# Patient Record
Sex: FEMALE | Race: WHITE
[De-identification: names, ages, dates, MRNs, and addresses within clinical notes are randomized per-mention and may not be internally consistent; named-entity substitution may affect disease eponyms.]

---

## 2017-02-23 ENCOUNTER — HOSPITAL ENCOUNTER (EMERGENCY)
Dept: HOSPITAL 62 - ER | Age: 27
Discharge: HOME | End: 2017-02-23
Payer: OTHER GOVERNMENT

## 2017-02-23 VITALS — DIASTOLIC BLOOD PRESSURE: 80 MMHG | SYSTOLIC BLOOD PRESSURE: 145 MMHG

## 2017-02-23 DIAGNOSIS — H10.9: Primary | ICD-10-CM

## 2017-02-23 PROCEDURE — 99282 EMERGENCY DEPT VISIT SF MDM: CPT

## 2017-02-23 NOTE — ER DOCUMENT REPORT
HPI





- HPI


Patient complains to provider of: eye discomfort


Onset: Yesterday


Onset/Duration: Gradual


Quality of pain: Burning


Pain Level: 5


Context: 


Patient states that she recently broke her glasses and has been wearing contact 

lenses for the past month.  Patient states that she has been wearing her 

contact lenses for the past 3 weeks.  Patient states she does take them out at 

nighttime and does not sleep in them.  Patient is currently wearing monthly 

disposable lenses.  Patient states that she started to have left eye discomfort 

yesterday.  Patient has taken her contact lens out since then.  Patient does 

feel like something is in the eye.  Patient denies any eye injury.  Patient 

reports some blurred vision.


Associated Symptoms: Other - Left eye discomfort


Exacerbated by: Denies


Relieved by: Denies


Similar symptoms previously: No


Recently seen / treated by doctor: No





- ROS


ROS below otherwise negative: Yes


Systems Reviewed and Negative: Yes All other systems reviewed and negative





- EENT


EENT: REPORTS: Eye problems





- NEURO


Neurology: DENIES: Headache





- GASTROINTESTINAL


Gastrointestinal: DENIES: Nausea, Patient vomiting





- REPRODUCTIVE


Reproductive: DENIES: Pregnant:





- DERM


Skin Color: Normal


Skin Problems: None





Past Medical History





- General


Information source: Patient





- Social History


Smoking Status: Never Smoker


Frequency of alcohol use: None


Drug Abuse: None


Occupation: kennel worker


Lives with: Family


Family History: Reviewed & Not Pertinent


Patient has suicidal ideation: No


Patient has homicidal ideation: No





- Past Medical History


Cardiac Medical History: 


   Denies: Hx Coronary Artery Disease, Hx Heart Attack, Hx Hypertension


Pulmonary Medical History: 


   Denies: Hx Asthma, Hx Bronchitis, Hx COPD, Hx Pneumonia


Neurological Medical History: Denies: Hx Cerebrovascular Accident, Hx Seizures


Renal/ Medical History: Reports: Other - Endometriosis, PCO S.  Denies: Hx 

Peritoneal Dialysis


Musculoskeltal Medical History: Denies Hx Arthritis


Past Surgical History: Reports: Hx Gynecologic Surgery - laparoscopic 

exploratory





- Immunizations


Immunizations up to date: No


Hx Diphtheria, Pertussis, Tetanus Vaccination: Yes





Vertical Provider Document





- CONSTITUTIONAL


Agree With Documented VS: Yes


Exam Limitations: No Limitations


General Appearance: WD/WN, No Apparent Distress


Notes: 


Left eye tearing with mild injection to sclera, no corneal abrasion/ulcer, 

fluoroscein uptake, no dendrite, or no foreign body.





- INFECTION CONTROL


TRAVEL OUTSIDE OF THE [place] IN LAST 30 DAYS: No





- HEENT


HEENT: Atraumatic, Normocephalic





- NECK


Neck: Normal Inspection





- RESPIRATORY


Respiratory: Breath Sounds Normal, No Respiratory Distress


O2 Sat by Pulse Oximetry: 100





- CARDIOVASCULAR


Cardiovascular: Regular Rate, Regular Rhythm, No Murmur





- BACK


Back: Normal Inspection





- MUSCULOSKELETAL/EXTREMETIES


Musculoskeletal/Extremeties: MAEW





- NEURO


Level of Consciousness: Awake, Alert, Appropriate


Motor/Sensory: No Motor Deficit





- DERM


Integumentary: Warm, Dry, No Rash





Course





- Vital Signs


Vital signs: 


 











Temp Pulse Resp BP Pulse Ox


 


 98.1 F   84   18   146/89 H  100 


 


 02/23/17 08:01  02/23/17 08:01  02/23/17 08:01  02/23/17 08:01  02/23/17 06:34














Discharge





- Discharge


Clinical Impression: 


Conjunctivitis


Qualifiers:


 Conjunctivitis type: unspecified Laterality: left Qualified Code(s): H10.9 - 

Unspecified conjunctivitis





Condition: Stable


Disposition: HOME, SELF-CARE


Instructions:  Conjunctivitis ([facility]), Eyedrop Use ([facility])


Additional Instructions: 


Return immediately for any new or worsening symptoms





Followup with your primary care provider, call tomorrow to make a followup 

appointment





Do not wear your contact lenses, until cleared to do so by an ophthalmologist





Follow-up with your ophthalmologist for recheck








Prescriptions: 


Besifloxacin HCl [Besivance 0.6% Oph Susp 5 ml] 1 drop OP TID #1 bottle


Referrals: 


[facility] [Provider Group] - Follow up as needed


[facility] [Provider Group] - Follow up as needed


THADDEUS MEJIA MD [Primary Care Provider] - Follow up tomorrow

## 2017-02-25 ENCOUNTER — HOSPITAL ENCOUNTER (EMERGENCY)
Dept: HOSPITAL 62 - ER | Age: 27
LOS: 1 days | Discharge: HOME | End: 2017-02-26
Payer: COMMERCIAL

## 2017-02-25 VITALS — DIASTOLIC BLOOD PRESSURE: 86 MMHG | SYSTOLIC BLOOD PRESSURE: 127 MMHG

## 2017-02-25 DIAGNOSIS — S60.221A: ICD-10-CM
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
## 2017-02-25 DIAGNOSIS — S80.211A: Primary | ICD-10-CM

## 2017-02-25 DIAGNOSIS — S40.211A: ICD-10-CM

## 2017-02-25 PROCEDURE — 99283 EMERGENCY DEPT VISIT LOW MDM: CPT
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
## 2017-04-13 ENCOUNTER — HOSPITAL ENCOUNTER (EMERGENCY)
Dept: HOSPITAL 62 - ER | Age: 27
LOS: 1 days | Discharge: HOME | End: 2017-04-14
Payer: OTHER GOVERNMENT

## 2017-04-13 DIAGNOSIS — N12: Primary | ICD-10-CM

## 2017-04-13 DIAGNOSIS — R30.0: ICD-10-CM

## 2017-04-13 DIAGNOSIS — R50.9: ICD-10-CM

## 2017-04-13 DIAGNOSIS — R10.9: ICD-10-CM

## 2017-04-13 DIAGNOSIS — R11.2: ICD-10-CM

## 2017-04-13 PROCEDURE — 99284 EMERGENCY DEPT VISIT MOD MDM: CPT

## 2017-04-13 PROCEDURE — 87088 URINE BACTERIA CULTURE: CPT

## 2017-04-13 PROCEDURE — 81025 URINE PREGNANCY TEST: CPT

## 2017-04-13 PROCEDURE — 87186 SC STD MICRODIL/AGAR DIL: CPT

## 2017-04-13 PROCEDURE — 81001 URINALYSIS AUTO W/SCOPE: CPT

## 2017-04-13 PROCEDURE — 87086 URINE CULTURE/COLONY COUNT: CPT

## 2017-04-13 PROCEDURE — 36415 COLL VENOUS BLD VENIPUNCTURE: CPT

## 2017-04-14 VITALS — DIASTOLIC BLOOD PRESSURE: 80 MMHG | SYSTOLIC BLOOD PRESSURE: 124 MMHG

## 2017-04-14 LAB
APPEARANCE UR: (no result)
BILIRUB UR QL STRIP: NEGATIVE
GLUCOSE UR STRIP-MCNC: NEGATIVE MG/DL
KETONES UR STRIP-MCNC: NEGATIVE MG/DL
NITRITE UR QL STRIP: POSITIVE
PH UR STRIP: 5 [PH] (ref 5–9)
PROT UR STRIP-MCNC: NEGATIVE MG/DL
SP GR UR STRIP: 1.03
UROBILINOGEN UR-MCNC: 4 MG/DL (ref ?–2)

## 2017-04-14 NOTE — ER DOCUMENT REPORT
ED General





- General


Chief Complaint: Flank Pain


Stated Complaint: FLANK PAIN, FEVER


Notes: 


Patient is a 26-year-old female who presents with 24 hours of left-sided flank 

pain, dysuria, and nausea with one episode of vomiting.  She denies any focal 

abdominal tenderness.  States this feels similar to what she's had kidney 

infections in the past.  She has not seen her primary care doctor regarding 

today's concerns.  Described pain in her left kidney as a dull, constant 

throbbing pain to left side of her back.  Nothing improves or worsens her pain.

  She is not had a recorded fever at home.  She has been able to tolerate oral 

intake after the episode of vomiting.


TRAVEL OUTSIDE OF THE [place] IN LAST 30 DAYS: No





- Related Data


Allergies/Adverse Reactions: 


 





No Known Allergies Allergy (Verified 02/23/17 08:31)


 











Past Medical History





- General


Information source: Patient





- Social History


Smoking Status: Never Smoker


Frequency of alcohol use: None


Drug Abuse: None


Lives with: Spouse/Significant other


Family History: Reviewed & Not Pertinent


Patient has suicidal ideation: No


Patient has homicidal ideation: No





- Past Medical History


Cardiac Medical History: 


   Denies: Hx Coronary Artery Disease, Hx Heart Attack, Hx Hypertension


Pulmonary Medical History: 


   Denies: Hx Asthma, Hx Bronchitis, Hx COPD, Hx Pneumonia


Neurological Medical History: Denies: Hx Cerebrovascular Accident, Hx Seizures


Renal/ Medical History: Denies: Hx Peritoneal Dialysis


Musculoskeltal Medical History: Denies Hx Arthritis


Past Surgical History: Reports: Hx Gynecologic Surgery - laparoscopic 

exploratory





- Immunizations


Immunizations up to date: No


Hx Diphtheria, Pertussis, Tetanus Vaccination: Yes





Review of Systems





- Review of Systems


Notes: 


Constitutional: Negative for fever.


HENT: Negative for sore throat.


Eyes: Negative for visual changes.


Cardiovascular: Negative for chest pain.


Respiratory: Negative for shortness of breath.


Gastrointestinal: Negative for abdominal pain, positive for vomiting.


Genitourinary: Positive for dysuria and left flank pain


Musculoskeletal: Negative for back pain.


Skin: Negative for rash.


Neurological: Negative for headaches, weakness or numbness.





10 point ROS negative except as marked above and in HPI.





Physical Exam





- Vital signs


Vitals: 


 











Temp Pulse Resp BP Pulse Ox


 


 98.6 F   111 H  18   132/77 H  97 


 


 04/13/17 23:44  04/13/17 23:44  04/13/17 23:44  04/13/17 23:44  04/13/17 23:44








Tachycardia is resolved at the time of my assessment a heart rate of 84.


Interpretation: Tachycardic


Notes: 


PHYSICAL EXAMINATION:





GENERAL: Well-appearing, well-nourished and in no acute distress.





HEAD: Atraumatic, normocephalic.





EYES: Pupils equal round and reactive to light, extraocular movements intact, 

sclera anicteric, conjunctiva are normal.





ENT: nares patent, oropharynx clear without exudates.  Moist mucous membranes.





NECK: Normal range of motion, supple without lymphadenopathy





LUNGS: Breath sounds clear to auscultation bilaterally and equal.  No wheezes 

rales or rhonchi.





HEART: Regular rate and rhythm without murmurs





ABDOMEN: Soft, nontender, normoactive bowel sounds.  No guarding, no rebound.  

No masses appreciated.  Mild left CVA tenderness.





EXTREMITIES: Normal range of motion, no pitting or edema.  No cyanosis.





NEUROLOGICAL: No focal neurological deficits. Moves all extremities 

spontaneously and on command.





PSYCH: Normal mood, normal affect.





SKIN: Warm, Dry, normal turgor, no rashes or lesions noted.





Course





- Re-evaluation


Re-evalutation: 





04/14/17 03:29


Presentation is most consistent with acute pyelonephritis.  Urinalysis with 

bacteria but is contaminated. Patient has had constitutional symptoms at home 

as well as a fever.  CVA tenderness is present on exam.   I do not suspect an 

acute appendicitis, biliary pathology, pancreatitis, intra-abdominal abscess, 

or tubo-ovarian abscess based on history and examination.she's been treated 

with cephalexin and Zofran.  Patient is able to tolerate oral intake without 

difficulty.  Will be discharged home on 7 day course of cephalexin.  A urine 

culture has been sent.  At this time will discharge with return precautions and 

follow-up recommendations.  Verbal discharge instructions given a the bedside 

and opportunity for questions given. Medication warnings reviewed. Patient is 

in agreement with this plan and has verbalized understanding of return 

precautions and the need for primary care follow-up in the next 24-72 hours.





- Vital Signs


Vital signs: 


 











Temp Pulse Resp BP Pulse Ox


 


 98.6 F   111 H  18   132/77 H  97 


 


 04/13/17 23:44  04/13/17 23:44  04/13/17 23:44  04/13/17 23:44  04/13/17 23:44














- Laboratory


Laboratory results interpreted by me: 


 











  04/14/17





  00:00


 


Urine Nitrite  POSITIVE H


 


Urine Urobilinogen  4.0 H














Discharge





- Discharge


Clinical Impression: 


 Pyelonephritis





Condition: Good


Disposition: HOME, SELF-CARE


Additional Instructions: 


You have been diagnosed with a condition called pyelonephritis which is an 

infection involving your kidneys and bladder.  You have been given a dose of 

antibiotics here in the emergency department to help begin to treat this 

infection.  Your also being sent home on antibiotics.  Please start taking 

these later on today when you fill the prescription.  Complete the course even 

if you feel better. Please return if you have persistent vomiting, pass out, 

have worsening pain, become unable to tolerate fluids, or have any other 

symptoms that are concerning to you.  Please follow-up with your primary care 

physician in the next 24-48 hours.

## 2019-02-21 ENCOUNTER — HOSPITAL ENCOUNTER (EMERGENCY)
Dept: HOSPITAL 62 - ER | Age: 29
LOS: 1 days | Discharge: HOME | End: 2019-02-22
Payer: SELF-PAY

## 2019-02-21 DIAGNOSIS — R51: ICD-10-CM

## 2019-02-21 DIAGNOSIS — M79.10: ICD-10-CM

## 2019-02-21 DIAGNOSIS — M79.89: ICD-10-CM

## 2019-02-21 DIAGNOSIS — R79.89: ICD-10-CM

## 2019-02-21 DIAGNOSIS — R42: ICD-10-CM

## 2019-02-21 DIAGNOSIS — L50.9: ICD-10-CM

## 2019-02-21 DIAGNOSIS — K08.89: Primary | ICD-10-CM

## 2019-02-21 LAB
ADD MANUAL DIFF: NO
ALBUMIN SERPL-MCNC: 3.7 G/DL (ref 3.5–5)
ALP SERPL-CCNC: 96 U/L (ref 38–126)
ALT SERPL-CCNC: 82 U/L (ref 9–52)
ANION GAP SERPL CALC-SCNC: 5 MMOL/L (ref 5–19)
AST SERPL-CCNC: 57 U/L (ref 14–36)
BASOPHILS # BLD AUTO: 0 10^3/UL (ref 0–0.2)
BASOPHILS NFR BLD AUTO: 0.9 % (ref 0–2)
BILIRUB DIRECT SERPL-MCNC: 0.1 MG/DL (ref 0–0.4)
BILIRUB SERPL-MCNC: 0.4 MG/DL (ref 0.2–1.3)
BUN SERPL-MCNC: 8 MG/DL (ref 7–20)
CALCIUM: 9.3 MG/DL (ref 8.4–10.2)
CHLORIDE SERPL-SCNC: 102 MMOL/L (ref 98–107)
CK SERPL-CCNC: 127 U/L (ref 30–135)
CO2 SERPL-SCNC: 32 MMOL/L (ref 22–30)
EOSINOPHIL # BLD AUTO: 0.3 10^3/UL (ref 0–0.6)
EOSINOPHIL NFR BLD AUTO: 6.6 % (ref 0–6)
ERYTHROCYTE [DISTWIDTH] IN BLOOD BY AUTOMATED COUNT: 15.5 % (ref 11.5–14)
GLUCOSE SERPL-MCNC: 109 MG/DL (ref 75–110)
HCT VFR BLD CALC: 36.3 % (ref 36–47)
HGB BLD-MCNC: 12.7 G/DL (ref 12–15.5)
LYMPHOCYTES # BLD AUTO: 1.5 10^3/UL (ref 0.5–4.7)
LYMPHOCYTES NFR BLD AUTO: 34.7 % (ref 13–45)
MCH RBC QN AUTO: 28.8 PG (ref 27–33.4)
MCHC RBC AUTO-ENTMCNC: 34.9 G/DL (ref 32–36)
MCV RBC AUTO: 83 FL (ref 80–97)
MONOCYTES # BLD AUTO: 0.3 10^3/UL (ref 0.1–1.4)
MONOCYTES NFR BLD AUTO: 6.2 % (ref 3–13)
NEUTROPHILS # BLD AUTO: 2.2 10^3/UL (ref 1.7–8.2)
NEUTS SEG NFR BLD AUTO: 51.6 % (ref 42–78)
PLATELET # BLD: 241 10^3/UL (ref 150–450)
POTASSIUM SERPL-SCNC: 4.3 MMOL/L (ref 3.6–5)
PROT SERPL-MCNC: 6.4 G/DL (ref 6.3–8.2)
RBC # BLD AUTO: 4.39 10^6/UL (ref 3.72–5.28)
SODIUM SERPL-SCNC: 138.6 MMOL/L (ref 137–145)
TOTAL CELLS COUNTED % (AUTO): 100 %
WBC # BLD AUTO: 4.3 10^3/UL (ref 4–10.5)

## 2019-02-21 PROCEDURE — 82550 ASSAY OF CK (CPK): CPT

## 2019-02-21 PROCEDURE — 93005 ELECTROCARDIOGRAM TRACING: CPT

## 2019-02-21 PROCEDURE — S0028 INJECTION, FAMOTIDINE, 20 MG: HCPCS

## 2019-02-21 PROCEDURE — 93010 ELECTROCARDIOGRAM REPORT: CPT

## 2019-02-21 PROCEDURE — 85025 COMPLETE CBC W/AUTO DIFF WBC: CPT

## 2019-02-21 PROCEDURE — 96375 TX/PRO/DX INJ NEW DRUG ADDON: CPT

## 2019-02-21 PROCEDURE — 96374 THER/PROPH/DIAG INJ IV PUSH: CPT

## 2019-02-21 PROCEDURE — 84703 CHORIONIC GONADOTROPIN ASSAY: CPT

## 2019-02-21 PROCEDURE — 80053 COMPREHEN METABOLIC PANEL: CPT

## 2019-02-21 PROCEDURE — 99284 EMERGENCY DEPT VISIT MOD MDM: CPT

## 2019-02-21 PROCEDURE — 36415 COLL VENOUS BLD VENIPUNCTURE: CPT

## 2019-02-21 NOTE — ER DOCUMENT REPORT
ED General





- General


Chief Complaint: Flu Symptoms


Stated Complaint: BODY PAIN


Time Seen by Provider: 02/21/19 22:07


Primary Care Provider: 


[facility] [Provider Group] - Follow up as needed


[facility] [Provider Group] - Follow up as needed


Mode of Arrival: Ambulatory


Information source: Patient


Notes: 





Patient presents complaining of dental pain and feeling lightheaded.  Patient 

also complains of the hand swelling that started today.  Patient denies any 

pruritus, difficulty breathing or difficulty swallowing.  Patient does complain 

of headache and generalized body aches.  Patient denies any nausea or vomiting. 

Patient denies any new foods medications or detergents.


TRAVEL OUTSIDE OF THE [place] IN LAST 30 DAYS: No





- HPI


Onset: Yesterday


Onset/Duration: Gradual


Quality of pain: Achy


Pain Level: 3


Associated symptoms: Body/muscle aches, Headache, Other - swelling to 

hands/feet.  denies: Nonproductive cough, Productive cough, Diarrhea, Fever, 

Nausea, Vomiting, Shortness of breath


Exacerbated by: Denies


Relieved by: Denies


Similar symptoms previously: No


Recently seen / treated by doctor: No





- Related Data


Allergies/Adverse Reactions: 


                                        





No Known Allergies Allergy (Verified 02/23/17 08:31)


   











Past Medical History





- General


Information source: Patient





- Social History


Smoking Status: Never Smoker


Chew tobacco use (# tins/day): No


Frequency of alcohol use: None


Drug Abuse: None


Occupation: none


Lives with: Family


Family History: Reviewed & Not Pertinent


Patient has suicidal ideation: No


Patient has homicidal ideation: No


Renal/ Medical History: Reports: Hx Ovarian Cysts, Other - Endometriosis.  

Denies: Hx Peritoneal Dialysis


Past Surgical History: Reports: Hx Gynecologic Surgery - laparoscopic 

exploratory





- Immunizations


Immunizations up to date: No


Hx Diphtheria, Pertussis, Tetanus Vaccination: Yes





Review of Systems





- Review of Systems


Constitutional: No symptoms reported.  denies: Fever


EENT: Dental problem


Cardiovascular: Lightheaded


Respiratory: No symptoms reported.  denies: Cough, Short of breath


Gastrointestinal: No symptoms reported.  denies: Abdominal pain, Diarrhea, 

Nausea, Vomiting


Genitourinary: No symptoms reported.  denies: Dysuria, Flank pain


Female Genitourinary: No symptoms reported


Musculoskeletal: Muscle pain, Other - Swelling to bilateral hands and feet


Skin: No symptoms reported


Hematologic/Lymphatic: No symptoms reported


Neurological/Psychological: Headaches.  denies: Confusion, Weakness, Lost 

consciousness





Physical Exam





- Vital signs


Vitals: 


                                        











Temp Pulse Resp BP Pulse Ox


 


 98.0 F   90   16   134/80 H  97 


 


 02/21/19 20:45  02/21/19 20:45  02/21/19 20:45  02/21/19 20:45  02/21/19 20:45














- General


General appearance: Appears well, Alert


In distress: None





- HEENT


Head: Normocephalic, Atraumatic


Eyes: Normal


Conjunctiva: Normal


Nasal: Normal


Mouth/Lips: Normal, Caries


Mucous membranes: Normal


Teeth diagram: 


                            __________________________














                            __________________________





 1 - dental decay, tenderness, no abscess, no trismus





Pharynx: Normal.  No: Uvular edema, Potential airway comprom.


Neck: Normal, Supple.  No: Lymphadenopathy





- Respiratory


Respiratory status: No respiratory distress


Chest status: Nontender


Breath sounds: Normal.  No: Rales, Rhonchi, Stridor, Wheezing


Chest palpation: Normal





- Cardiovascular


Rhythm: Regular


Heart sounds: S1 appreciated, S2 appreciated


Murmur: No





- Abdominal


Inspection: Obese


Distension: No distension


Bowel sounds: Normal





- Back


Back: Normal, Nontender





- Extremities


General upper extremity: Normal inspection, Normal strength


General lower extremity: Edema - Bilateral 1+ edema to the ankles and feet, 

Other - Patient with faint erythema to bilateral lower extremities from mid calf

distally with dermatographism





- Neurological


Neuro grossly intact: Yes


Cognition: Normal


[place] Coma Scale Eye Opening: Spontaneous


[place] Coma Scale Verbal: Oriented


[place] Coma Scale Motor: Obeys Commands


[place] Coma Scale Total: 15





- Psychological


Associated symptoms: Normal affect, Normal mood





- Skin


Skin Temperature: Warm


Skin Moisture: Dry


Skin Color: Erythema - Bilateral distal two thirds of bilateral lower 

extremities, ankles, feet erythematous with 1+ edema and dermatographsm





Course





- Re-evaluation


Re-evalutation: 





02/22/19 


Patient without any pruritus at this time.  Patient denies any difficulty 

breathing or swallowing.  Patient reports lightheadedness is improved at this 

time.  Patient reports headache is improved as well.  Patient with urticarial 

type faint rash to extremities with mild swelling and dermatographism.  Patient 

with dental pain without any dental abscess or potential airway compromise.  

Patient nontoxic in appearance.  Discussed worsening symptoms that patient 

should return immediately for.  Patient advised of mildly elevated liver 

function tests and need to have this test rechecked by her primary doctor.  

Patient is agreeable with discharge plan of care at this time.








- Vital Signs


Vital signs: 


                                        











Temp Pulse Resp BP Pulse Ox


 


 98.3 F   90   18   145/98 H  95 


 


 02/22/19 00:51  02/22/19 00:51  02/22/19 00:51  02/22/19 00:51  02/22/19 00:51














- Laboratory


Result Diagrams: 


                                 02/21/19 23:00





                                 02/21/19 23:00


Laboratory results interpreted by me: 


                                        











  02/21/19 02/21/19





  23:00 23:00


 


RDW  15.5 H 


 


Eosinophils %  6.6 H 


 


Carbon Dioxide   32 H


 


AST   57 H


 


ALT   82 H














- EKG Interpretation by Me


EKG shows normal: Sinus rhythm


Rate: Normal


Rhythm: NSR


Additional EKG results interpreted by me: 





02/22/19 00:21


No ST elevation.  QTc 407.





Discharge





- Discharge


Clinical Impression: 


 Toothache, Urticaria, Myalgia, Elevated liver function tests





Condition: Stable


Disposition: HOME, SELF-CARE


Instructions:  Acute Urticaria ([facility]), Liver Function Abnormality ([facility]), Myalagia

(Muscle Pain) ([facility]), Toothache ([facility])


Additional Instructions: 


Return immediately for any new or worsening symptoms





Followup with your primary care provider, call tomorrow to make a followup 

appointment





Your liver function tests were mildly elevated today.  Your primary doctor can 

recheck this in about a week.  Avoid alcohol or excessive Tylenol consumption.


Prescriptions: 


Famotidine [Pepcid 20 mg Tablet] 20 mg PO BID #12 tablet


Hydroxyzine HCl [Atarax 25 mg Tablet] 1 - 2 tab PO QID #20 tablet


Penicillin V Potassium [Penicillin Vk 500 mg Tablet] 500 mg PO BID #20 tablet


Prednisone [Deltasone 10 mg Tablet] 10 mg PO ASDIR PRN #21 tablet


 PRN Reason: 


Referrals: 


[facility] [Provider Group] - Follow up as needed


[facility] [Provider Group] - Follow up as needed

## 2019-02-22 VITALS — DIASTOLIC BLOOD PRESSURE: 98 MMHG | SYSTOLIC BLOOD PRESSURE: 145 MMHG

## 2019-03-20 ENCOUNTER — HOSPITAL ENCOUNTER (EMERGENCY)
Dept: HOSPITAL 62 - ER | Age: 29
LOS: 1 days | Discharge: HOME | End: 2019-03-21
Payer: SELF-PAY

## 2019-03-20 DIAGNOSIS — M79.604: ICD-10-CM

## 2019-03-20 DIAGNOSIS — M79.605: ICD-10-CM

## 2019-03-20 DIAGNOSIS — R60.0: Primary | ICD-10-CM

## 2019-03-20 PROCEDURE — 99283 EMERGENCY DEPT VISIT LOW MDM: CPT

## 2019-03-20 PROCEDURE — 85025 COMPLETE CBC W/AUTO DIFF WBC: CPT

## 2019-03-20 PROCEDURE — 80053 COMPREHEN METABOLIC PANEL: CPT

## 2019-03-20 PROCEDURE — 84481 FREE ASSAY (FT-3): CPT

## 2019-03-20 PROCEDURE — 84439 ASSAY OF FREE THYROXINE: CPT

## 2019-03-20 PROCEDURE — 36415 COLL VENOUS BLD VENIPUNCTURE: CPT

## 2019-03-20 PROCEDURE — 84443 ASSAY THYROID STIM HORMONE: CPT

## 2019-03-20 PROCEDURE — 84702 CHORIONIC GONADOTROPIN TEST: CPT

## 2019-03-21 VITALS — DIASTOLIC BLOOD PRESSURE: 81 MMHG | SYSTOLIC BLOOD PRESSURE: 99 MMHG

## 2019-03-21 LAB
ADD MANUAL DIFF: NO
ALBUMIN SERPL-MCNC: 3.7 G/DL (ref 3.5–5)
ALP SERPL-CCNC: 88 U/L (ref 38–126)
ALT SERPL-CCNC: 67 U/L (ref 9–52)
ANION GAP SERPL CALC-SCNC: 7 MMOL/L (ref 5–19)
AST SERPL-CCNC: 42 U/L (ref 14–36)
BASOPHILS # BLD AUTO: 0 10^3/UL (ref 0–0.2)
BASOPHILS NFR BLD AUTO: 0.5 % (ref 0–2)
BILIRUB DIRECT SERPL-MCNC: 0.1 MG/DL (ref 0–0.4)
BILIRUB SERPL-MCNC: 0.7 MG/DL (ref 0.2–1.3)
BUN SERPL-MCNC: 7 MG/DL (ref 7–20)
CALCIUM: 9.4 MG/DL (ref 8.4–10.2)
CHLORIDE SERPL-SCNC: 102 MMOL/L (ref 98–107)
CO2 SERPL-SCNC: 30 MMOL/L (ref 22–30)
EOSINOPHIL # BLD AUTO: 0.4 10^3/UL (ref 0–0.6)
EOSINOPHIL NFR BLD AUTO: 7 % (ref 0–6)
ERYTHROCYTE [DISTWIDTH] IN BLOOD BY AUTOMATED COUNT: 15 % (ref 11.5–14)
FREE T4 (FREE THYROXINE): 0.9 NG/DL (ref 0.78–2.19)
GLUCOSE SERPL-MCNC: 98 MG/DL (ref 75–110)
HCT VFR BLD CALC: 36.8 % (ref 36–47)
HGB BLD-MCNC: 12.9 G/DL (ref 12–15.5)
LYMPHOCYTES # BLD AUTO: 1.3 10^3/UL (ref 0.5–4.7)
LYMPHOCYTES NFR BLD AUTO: 23.7 % (ref 13–45)
MCH RBC QN AUTO: 29 PG (ref 27–33.4)
MCHC RBC AUTO-ENTMCNC: 35 G/DL (ref 32–36)
MCV RBC AUTO: 83 FL (ref 80–97)
MONOCYTES # BLD AUTO: 0.4 10^3/UL (ref 0.1–1.4)
MONOCYTES NFR BLD AUTO: 6.7 % (ref 3–13)
NEUTROPHILS # BLD AUTO: 3.5 10^3/UL (ref 1.7–8.2)
NEUTS SEG NFR BLD AUTO: 62.1 % (ref 42–78)
PLATELET # BLD: 281 10^3/UL (ref 150–450)
POTASSIUM SERPL-SCNC: 3.8 MMOL/L (ref 3.6–5)
PROT SERPL-MCNC: 6.9 G/DL (ref 6.3–8.2)
RBC # BLD AUTO: 4.44 10^6/UL (ref 3.72–5.28)
SODIUM SERPL-SCNC: 138.6 MMOL/L (ref 137–145)
T3FREE SERPL-MCNC: 5.18 PG/ML (ref 2.77–5.27)
TOTAL CELLS COUNTED % (AUTO): 100 %
TSH SERPL-ACNC: 1.58 UIU/ML (ref 0.47–4.68)
WBC # BLD AUTO: 5.6 10^3/UL (ref 4–10.5)

## 2019-03-21 NOTE — ER DOCUMENT REPORT
ED General





- General


Chief Complaint: Feet Swelling


Stated Complaint: LEG SWELLING


Time Seen by Provider: 03/21/19 00:12


Mode of Arrival: Ambulatory


Information source: Patient


Notes: 





This is a 28-year-old female with a history of endometriosis and polycystic 

ovaries who presents to the emergency room with lower extremity swelling and 

pain to the lower extremities.  Patient states symptoms first started 2 weeks 

ago and have persisted.  She denies any fever, chills, nausea or vomiting.  She 

denies any shortness of breath.  She reports intermittent headache.


TRAVEL OUTSIDE OF THE [place] IN LAST 30 DAYS: No





- HPI


Onset: Last week


Onset/Duration: Gradual


Quality of pain: Dull


Severity: Moderate


Pain Level: 2


Associated symptoms: denies: Chest pain, Fever, Shortness of breath


Exacerbated by: Denies


Relieved by: Denies


Similar symptoms previously: Yes


Recently seen / treated by doctor: Yes





- Related Data


Allergies/Adverse Reactions: 


                                        





No Known Allergies Allergy (Verified 02/23/17 08:31)


   











Past Medical History





- General


Information source: Patient





- Social History


Smoking Status: Never Smoker


Cigarette use (# per day): No


Chew tobacco use (# tins/day): No


Frequency of alcohol use: None


Drug Abuse: None


Lives with: Family


Family History: Reviewed & Not Pertinent


Patient has suicidal ideation: No


Patient has homicidal ideation: No





- Past Medical History


Cardiac Medical History: 


   Denies: Hx Coronary Artery Disease, Hx Heart Attack, Hx Hypertension


Pulmonary Medical History: 


   Denies: Hx Asthma, Hx Bronchitis, Hx COPD, Hx Pneumonia


Neurological Medical History: Denies: Hx Cerebrovascular Accident, Hx Seizures


Renal/ Medical History: Reports: Hx Ovarian Cysts - Polycystic ovaries, Other 

- Endometriosis.  Denies: Hx Peritoneal Dialysis


GI Medical History: Reports: None


Musculoskeletal Medical History: Reports None, Denies Hx Arthritis


Skin Medical History: Reports None


Psychiatric Medical History: Reports: None


Traumatic Medical History: Reports: None


Past Surgical History: Reports: Hx Gynecologic Surgery - laparoscopic 

exploratory





- Immunizations


Immunizations up to date: No


Hx Diphtheria, Pertussis, Tetanus Vaccination: Yes





Review of Systems





- Review of Systems


Constitutional: denies: Chills, Fever


EENT: No symptoms reported


Cardiovascular: Edema.  denies: Chest pain, Palpitations, Heart racing


Respiratory: No symptoms reported


Gastrointestinal: No symptoms reported


Genitourinary: No symptoms reported


Female Genitourinary: No symptoms reported


Musculoskeletal: See HPI


Skin: See HPI


Hematologic/Lymphatic: No symptoms reported


Neurological/Psychological: No symptoms reported





Physical Exam





- Vital signs


Vitals: 


                                        











Temp Pulse Resp BP Pulse Ox


 


 98.6 F   97   18   119/75   99 


 


 03/20/19 22:38  03/20/19 22:38  03/20/19 22:38  03/20/19 22:38  03/20/19 22:38











Notes: 





Physical exam:


 


GENERAL: Is alert and oriented x3, no acute distress





HEAD: Atraumatic, normocephalic.





EYES: Pupils equal round and reactive to light, extraocular movements intact, 

sclera anicteric, conjunctiva are normal.





ENT: TMs normal, nares patent, oropharynx clear without exudates.  Moist mucous 

membranes.





NECK: Normal range of motion, supple without obvious mass or JVD.





LUNGS: Breath sounds clear to auscultation bilaterally and equal.  No wheezes 

rales or rhonchi.





HEART: Regular rate and rhythm without murmurs, rubs or gallops.





ABDOMEN: Soft, normoactive bowel sounds.  No tenderness to palpation.  No 

guarding, no rebound.  No masses appreciated.





EXTREMITIES: 1+ edema bilaterally





NEUROLOGICAL: Cranial nerves II through XII grossly intact.  Normal speech, 

moving all extremities.





PSYCH: Normal mood, normal affect.





SKIN: Warm, Dry, normal turgor, no rashes or lesions noted.





Course





- Re-evaluation


Re-evalutation: 





03/21/19 02:33


Note: Patient looks quite good.  I have explained to her having to wear 

compression stockings, raise feet.  I will give her an instructions sheet on 

peripheral edema.  We will treat her temporarily with Lasix.





- Vital Signs


Vital signs: 


                                        











Temp Pulse Resp BP Pulse Ox


 


 98.6 F   97   18   119/75   99 


 


 03/20/19 22:38  03/20/19 22:38  03/20/19 22:38  03/20/19 22:38  03/20/19 22:38














- Laboratory


Result Diagrams: 


                                 03/21/19 00:32





                                 03/21/19 00:32


Laboratory results interpreted by me: 


                                        











  03/21/19 03/21/19





  00:32 00:32


 


RDW  15.0 H 


 


Eosinophils %  7.0 H 


 


AST   42 H


 


ALT   67 H














Discharge





- Discharge


Clinical Impression: 


 Pedal edema





Condition: Stable


Disposition: HOME, SELF-CARE


Instructions:  Edema, Peripheral ([facility])


Additional Instructions: 


See the instruction sheet on peripheral edema.


Keep your legs elevated at night.


Use compression stockings: These are sold at the pharmacy.  


Take the medicines as prescribed for the lower extremity swelling.


Follow-up at the [facility].





Prescriptions: 


Furosemide [Lasix 20 mg Tablet] 20 mg PO QAM #7 tablet


Potassium Chloride [K-Tab ER] 10 meq PO DAILY #7 tablet.er


Referrals: 


[facility] [Provider Group] - Follow up as needed